# Patient Record
Sex: FEMALE | Race: WHITE | NOT HISPANIC OR LATINO | URBAN - METROPOLITAN AREA
[De-identification: names, ages, dates, MRNs, and addresses within clinical notes are randomized per-mention and may not be internally consistent; named-entity substitution may affect disease eponyms.]

---

## 2018-05-17 NOTE — PATIENT DISCUSSION
Recommended artificial tears to use as directed.  use Systane Balance QID and especially with near tasking (reading) due to decreased blink rate.

## 2020-11-12 ENCOUNTER — IMPORTED ENCOUNTER (OUTPATIENT)
Dept: URBAN - METROPOLITAN AREA CLINIC 31 | Facility: CLINIC | Age: 80
End: 2020-11-12

## 2020-11-12 PROBLEM — H18.451: Noted: 2020-11-12

## 2020-11-12 PROBLEM — H16.223: Noted: 2020-11-12

## 2020-11-12 PROBLEM — H35.3132: Noted: 2020-11-12

## 2020-11-12 PROBLEM — H17.89: Noted: 2020-11-12

## 2020-11-12 PROBLEM — H25.13: Noted: 2020-11-12

## 2020-11-12 PROCEDURE — 99203 OFFICE O/P NEW LOW 30 MIN: CPT

## 2020-11-12 NOTE — PATIENT DISCUSSION
ARMD OU dry - Importance of smoking cessation blood pressure control and healthy diet were emphasized. In accordance with the AREDS study a good multivitamin containing EC and Zinc were recommened to be taken daily. Patient was instructed to self monitor their monocular vision (reading/Amsler Grid) at least weekly. Patient should immediately report any new onset of decreased vision or metamorphopsia. f/u Dr Daniele Ni

## 2020-11-12 NOTE — PATIENT DISCUSSION
1. Keratoconjunctivitis sicca OU:   MMP-9 Low Schirmers. Discussed treatment options frequent AT Restasis Xiidra or Cequa to stimulate increased tear production. Drops may take 3-6 months for full effect. Start restasis 2x/d t/c plugs or serum tears if not better2. Nuclear Sclerotic Cataract OU: Explained how cataracts can effect vision. Recommend clinical observation. The patient was advised to contact us if any change or worsening of vision. 3. ARMD OU dry - Importance of smoking cessation blood pressure control and healthy diet were emphasized. In accordance with the AREDS study a good multivitamin containing EC and Zinc were recommened to be taken daily. Patient was instructed to self monitor their monocular vision (reading/Amsler Grid) at least weekly. Patient should immediately report any new onset of decreased vision or metamorphopsia. f/u Dr Stephanie Fox. Discussed nodules and induced astigmatism. Treatment options discussed including observation vs excision. 5.  S/P  Lasik: monovision happy with [facility] for an appointment in 6 weeks for office call. Topography and Tear Osmolarity. with Dr. Angie Garza.

## 2020-11-12 NOTE — PATIENT DISCUSSION
Discussed nodules and induced astigmatism. Treatment options discussed including observation vs excision.

## 2020-11-12 NOTE — PATIENT DISCUSSION
S/P  Lasik: monovision happy with vision[facility] for an appointment in 6 weeks for office call. Topography and Tear Osmolarity. with Dr. Nae Moore.

## 2021-01-07 ENCOUNTER — IMPORTED ENCOUNTER (OUTPATIENT)
Dept: URBAN - METROPOLITAN AREA CLINIC 31 | Facility: CLINIC | Age: 81
End: 2021-01-07

## 2021-01-07 PROBLEM — H25.13: Noted: 2021-01-07

## 2021-01-07 PROBLEM — H16.223: Noted: 2021-01-07

## 2021-01-07 PROBLEM — H04.123: Noted: 2021-01-07

## 2021-01-07 PROBLEM — H18.453: Noted: 2021-01-07

## 2021-01-07 PROCEDURE — 92012 INTRM OPH EXAM EST PATIENT: CPT

## 2021-01-07 PROCEDURE — 92025 CPTRIZED CORNEAL TOPOGRAPHY: CPT

## 2021-01-07 PROCEDURE — 83861 MICROFLUID ANALY TEARS: CPT

## 2021-01-07 NOTE — PATIENT DISCUSSION
1. Keratoconjunctivitis Sicca  OU:  Restasis showed minimal improvement to [employer] BID. Discussed that Jose Jungling helps to increase the production of the patient's own tears and therefore can take 3-4 months for an improvement in symptoms. Use AT frequent. t/c plugs2. Discussed nodules and induced astigmatism. Treatment options discussed including observation vs excision. baseline berry today. 3. Nuclear Sclerotic Cataract OU: Explained how cataracts can effect vision. Recommend clinical observation. The patient was advised to contact us if any change or worsening of vision. 4. Return for an appointment in 1 month for office call. with Dr. Kathryn Terry.

## 2021-01-07 NOTE — PATIENT DISCUSSION
Discussed nodules and induced astigmatism. Treatment options discussed including observation vs excision. baseline berry today.

## 2021-02-04 ENCOUNTER — IMPORTED ENCOUNTER (OUTPATIENT)
Dept: URBAN - METROPOLITAN AREA CLINIC 31 | Facility: CLINIC | Age: 81
End: 2021-02-04

## 2021-02-04 PROBLEM — H25.13: Noted: 2021-02-04

## 2021-02-04 PROBLEM — H16.223: Noted: 2021-02-04

## 2021-02-04 PROBLEM — H18.451: Noted: 2021-02-04

## 2021-02-04 PROCEDURE — 92012 INTRM OPH EXAM EST PATIENT: CPT

## 2021-02-04 NOTE — PATIENT DISCUSSION
1.  Discussed nodules and induced astigmatism. Treatment options discussed including observation vs excision. Pt more symptomatic add angle qhs tears if not better recommend SK can be done in office with valium or at the surgery center with IVS.  If done at surgery center would use [address] to reduce recurrence Schedule OD if desired. 2. Keratoconjunctivitis Sicca OU:  better but irritation from xiidra will dc tear and ung3. Nuclear Sclerotic Cataract OU: Explained how cataracts can effect vision. Recommend clinical observation. The patient was advised to contact us if any change or worsening of vision. 4. Return for an appointment in 6 months for office call. with Dr. Chris Pandey.

## 2021-06-15 NOTE — PATIENT DISCUSSION
Discussed condition and exacerbating conditions/situations (e.g., dry/arid environments, overhead fans, air conditioners, side effect of medications). I will STOP taking the medications listed below when I get home from the hospital:  None

## 2022-04-01 ASSESSMENT — VISUAL ACUITY
OD_CC: 20/60-2
OD_PH: SC 20/30 +2
OS_CC: 20/400
OD_PH: SC 20/25 -3
OS_PH: SC 20/50 -2
OS_SC: J1
OD_CC: 20/30-1
OS_PH: SC 20/60 -2
OS_SC: 20/30+2
OD_CC: 20/50-2
OS_SC: J2